# Patient Record
Sex: MALE | Race: WHITE | Employment: OTHER | URBAN - METROPOLITAN AREA
[De-identification: names, ages, dates, MRNs, and addresses within clinical notes are randomized per-mention and may not be internally consistent; named-entity substitution may affect disease eponyms.]

---

## 2017-01-19 ENCOUNTER — LAB REQUISITION (OUTPATIENT)
Dept: LAB | Facility: HOSPITAL | Age: 82
End: 2017-01-19
Payer: MEDICARE

## 2017-01-19 DIAGNOSIS — R31.9 HEMATURIA: ICD-10-CM

## 2017-01-19 PROCEDURE — 87086 URINE CULTURE/COLONY COUNT: CPT | Performed by: INTERNAL MEDICINE

## 2017-02-02 ENCOUNTER — HOSPITAL ENCOUNTER (OUTPATIENT)
Dept: CT IMAGING | Facility: HOSPITAL | Age: 82
Discharge: HOME | End: 2017-02-02
Attending: UROLOGY
Payer: MEDICARE

## 2017-02-02 DIAGNOSIS — R31.9 HEMATURIA: ICD-10-CM

## 2017-02-02 LAB — CREAT BLD-MCNC: 2 MG/DL (ref 0.5–1.5)

## 2017-02-02 PROCEDURE — 82565 ASSAY OF CREATININE: CPT

## 2017-02-02 NOTE — IMAGING NOTE
PATIENT IS FOUND TO HAVE A CREATININE OF 2.0 TODAY AND I SPOKE TO SHIMON AT DR. ED JENSEN'S OFFICE AND SHE INSTRUCTED ME TO NOT PERFORM ANY SCANS AND SEND THE PATIENT HOME.

## 2017-02-08 ENCOUNTER — HOSPITAL ENCOUNTER (OUTPATIENT)
Dept: CT IMAGING | Facility: HOSPITAL | Age: 82
Discharge: HOME | End: 2017-02-08
Attending: UROLOGY
Payer: MEDICARE

## 2017-02-08 DIAGNOSIS — R31.29 HEMATURIA, MICROSCOPIC: ICD-10-CM

## 2017-02-08 PROCEDURE — 88108 CYTOPATH CONCENTRATE TECH: CPT | Performed by: UROLOGY

## 2017-02-08 PROCEDURE — 74176 CT ABD & PELVIS W/O CONTRAST: CPT

## 2017-02-08 PROCEDURE — 80048 BASIC METABOLIC PNL TOTAL CA: CPT

## 2017-02-08 PROCEDURE — 85025 COMPLETE CBC W/AUTO DIFF WBC: CPT

## 2017-02-08 PROCEDURE — 36415 COLL VENOUS BLD VENIPUNCTURE: CPT

## 2017-04-14 ENCOUNTER — LAB REQUISITION (OUTPATIENT)
Dept: LAB | Facility: HOSPITAL | Age: 82
End: 2017-04-14
Payer: MEDICARE

## 2017-04-14 DIAGNOSIS — Z01.89 ENCOUNTER FOR OTHER SPECIFIED SPECIAL EXAMINATIONS: ICD-10-CM

## 2017-04-14 PROCEDURE — 80048 BASIC METABOLIC PNL TOTAL CA: CPT | Performed by: INTERNAL MEDICINE

## 2017-04-14 PROCEDURE — 83036 HEMOGLOBIN GLYCOSYLATED A1C: CPT | Performed by: INTERNAL MEDICINE

## 2017-04-14 PROCEDURE — 80061 LIPID PANEL: CPT | Performed by: INTERNAL MEDICINE

## 2017-04-14 PROCEDURE — 84460 ALANINE AMINO (ALT) (SGPT): CPT | Performed by: INTERNAL MEDICINE

## 2017-10-13 ENCOUNTER — LAB REQUISITION (OUTPATIENT)
Dept: LAB | Facility: HOSPITAL | Age: 82
End: 2017-10-13
Payer: MEDICARE

## 2017-10-13 DIAGNOSIS — R73.01 IMPAIRED FASTING GLUCOSE: ICD-10-CM

## 2017-10-13 DIAGNOSIS — E78.00 PURE HYPERCHOLESTEROLEMIA: ICD-10-CM

## 2017-10-13 DIAGNOSIS — N18.30 CHRONIC KIDNEY DISEASE, STAGE III (MODERATE) (HCC): ICD-10-CM

## 2017-10-13 PROCEDURE — 82043 UR ALBUMIN QUANTITATIVE: CPT | Performed by: INTERNAL MEDICINE

## 2017-10-13 PROCEDURE — 83036 HEMOGLOBIN GLYCOSYLATED A1C: CPT | Performed by: INTERNAL MEDICINE

## 2017-10-13 PROCEDURE — 80053 COMPREHEN METABOLIC PANEL: CPT | Performed by: INTERNAL MEDICINE

## 2017-10-13 PROCEDURE — 82570 ASSAY OF URINE CREATININE: CPT | Performed by: INTERNAL MEDICINE

## 2017-10-13 PROCEDURE — 81001 URINALYSIS AUTO W/SCOPE: CPT | Performed by: INTERNAL MEDICINE

## 2017-10-13 PROCEDURE — 84443 ASSAY THYROID STIM HORMONE: CPT | Performed by: INTERNAL MEDICINE

## 2017-10-13 PROCEDURE — 80061 LIPID PANEL: CPT | Performed by: INTERNAL MEDICINE

## 2017-10-13 PROCEDURE — 85025 COMPLETE CBC W/AUTO DIFF WBC: CPT | Performed by: INTERNAL MEDICINE

## 2017-10-16 ENCOUNTER — LAB REQUISITION (OUTPATIENT)
Dept: LAB | Facility: HOSPITAL | Age: 82
End: 2017-10-16
Payer: MEDICARE

## 2017-10-16 DIAGNOSIS — N39.0 URINARY TRACT INFECTION: ICD-10-CM

## 2017-10-16 PROCEDURE — 87086 URINE CULTURE/COLONY COUNT: CPT | Performed by: INTERNAL MEDICINE

## 2017-10-16 PROCEDURE — 87186 SC STD MICRODIL/AGAR DIL: CPT | Performed by: INTERNAL MEDICINE

## 2017-10-16 PROCEDURE — 87088 URINE BACTERIA CULTURE: CPT | Performed by: INTERNAL MEDICINE

## 2018-01-02 ENCOUNTER — LAB REQUISITION (OUTPATIENT)
Dept: LAB | Facility: HOSPITAL | Age: 83
End: 2018-01-02
Payer: MEDICARE

## 2018-01-02 DIAGNOSIS — N39.0 URINARY TRACT INFECTION: ICD-10-CM

## 2018-01-02 PROCEDURE — 87186 SC STD MICRODIL/AGAR DIL: CPT | Performed by: INTERNAL MEDICINE

## 2018-01-02 PROCEDURE — 87086 URINE CULTURE/COLONY COUNT: CPT | Performed by: INTERNAL MEDICINE

## 2018-01-02 PROCEDURE — 87088 URINE BACTERIA CULTURE: CPT | Performed by: INTERNAL MEDICINE

## 2018-03-13 ENCOUNTER — LAB REQUISITION (OUTPATIENT)
Dept: LAB | Facility: HOSPITAL | Age: 83
End: 2018-03-13
Payer: MEDICARE

## 2018-03-13 DIAGNOSIS — N39.0 URINARY TRACT INFECTION: ICD-10-CM

## 2018-03-13 PROCEDURE — 87186 SC STD MICRODIL/AGAR DIL: CPT | Performed by: INTERNAL MEDICINE

## 2018-03-13 PROCEDURE — 87086 URINE CULTURE/COLONY COUNT: CPT | Performed by: INTERNAL MEDICINE

## 2018-03-13 PROCEDURE — 87088 URINE BACTERIA CULTURE: CPT | Performed by: INTERNAL MEDICINE

## 2018-04-20 ENCOUNTER — LAB REQUISITION (OUTPATIENT)
Dept: LAB | Facility: HOSPITAL | Age: 83
End: 2018-04-20
Payer: MEDICARE

## 2018-04-20 DIAGNOSIS — E78.00 PURE HYPERCHOLESTEROLEMIA: ICD-10-CM

## 2018-04-20 DIAGNOSIS — N18.30 CHRONIC KIDNEY DISEASE, STAGE III (MODERATE) (HCC): ICD-10-CM

## 2018-04-20 DIAGNOSIS — R73.01 IMPAIRED FASTING GLUCOSE: ICD-10-CM

## 2018-04-20 PROCEDURE — 80061 LIPID PANEL: CPT | Performed by: INTERNAL MEDICINE

## 2018-04-20 PROCEDURE — 80048 BASIC METABOLIC PNL TOTAL CA: CPT | Performed by: INTERNAL MEDICINE

## 2018-04-20 PROCEDURE — 84460 ALANINE AMINO (ALT) (SGPT): CPT | Performed by: INTERNAL MEDICINE

## 2018-04-20 PROCEDURE — 83036 HEMOGLOBIN GLYCOSYLATED A1C: CPT | Performed by: INTERNAL MEDICINE

## 2018-10-10 ENCOUNTER — HOSPITAL ENCOUNTER (OUTPATIENT)
Dept: GENERAL RADIOLOGY | Age: 83
Discharge: HOME | End: 2018-10-10
Attending: INTERNAL MEDICINE
Payer: MEDICARE

## 2018-10-10 DIAGNOSIS — Z85.820 HISTORY OF MELANOMA: ICD-10-CM

## 2018-10-10 DIAGNOSIS — C43.9 MELANOMA (HCC): ICD-10-CM

## 2018-10-10 PROCEDURE — 71046 X-RAY EXAM CHEST 2 VIEWS: CPT | Performed by: INTERNAL MEDICINE

## 2018-10-12 ENCOUNTER — LAB REQUISITION (OUTPATIENT)
Dept: LAB | Facility: HOSPITAL | Age: 83
End: 2018-10-12
Payer: MEDICARE

## 2018-10-12 DIAGNOSIS — E78.00 PURE HYPERCHOLESTEROLEMIA: ICD-10-CM

## 2018-10-12 DIAGNOSIS — R73.01 IMPAIRED FASTING GLUCOSE: ICD-10-CM

## 2018-10-12 DIAGNOSIS — N18.30 CHRONIC KIDNEY DISEASE, STAGE III (MODERATE) (HCC): ICD-10-CM

## 2018-10-12 PROCEDURE — 87186 SC STD MICRODIL/AGAR DIL: CPT | Performed by: INTERNAL MEDICINE

## 2018-10-12 PROCEDURE — 83036 HEMOGLOBIN GLYCOSYLATED A1C: CPT | Performed by: INTERNAL MEDICINE

## 2018-10-12 PROCEDURE — 82570 ASSAY OF URINE CREATININE: CPT | Performed by: INTERNAL MEDICINE

## 2018-10-12 PROCEDURE — 85025 COMPLETE CBC W/AUTO DIFF WBC: CPT | Performed by: INTERNAL MEDICINE

## 2018-10-12 PROCEDURE — 82043 UR ALBUMIN QUANTITATIVE: CPT | Performed by: INTERNAL MEDICINE

## 2018-10-12 PROCEDURE — 87086 URINE CULTURE/COLONY COUNT: CPT | Performed by: INTERNAL MEDICINE

## 2018-10-12 PROCEDURE — 87088 URINE BACTERIA CULTURE: CPT | Performed by: INTERNAL MEDICINE

## 2018-10-12 PROCEDURE — 81001 URINALYSIS AUTO W/SCOPE: CPT | Performed by: INTERNAL MEDICINE

## 2018-10-12 PROCEDURE — 80053 COMPREHEN METABOLIC PANEL: CPT | Performed by: INTERNAL MEDICINE

## 2018-10-12 PROCEDURE — 80061 LIPID PANEL: CPT | Performed by: INTERNAL MEDICINE

## 2019-04-12 ENCOUNTER — LAB REQUISITION (OUTPATIENT)
Dept: LAB | Facility: HOSPITAL | Age: 84
End: 2019-04-12
Payer: MEDICARE

## 2019-04-12 DIAGNOSIS — R73.01 IMPAIRED FASTING GLUCOSE: ICD-10-CM

## 2019-04-12 DIAGNOSIS — N18.30 CHRONIC KIDNEY DISEASE, STAGE III (MODERATE) (HCC): ICD-10-CM

## 2019-04-12 DIAGNOSIS — E78.00 PURE HYPERCHOLESTEROLEMIA: ICD-10-CM

## 2019-04-12 PROCEDURE — 84460 ALANINE AMINO (ALT) (SGPT): CPT | Performed by: INTERNAL MEDICINE

## 2019-04-12 PROCEDURE — 83036 HEMOGLOBIN GLYCOSYLATED A1C: CPT | Performed by: INTERNAL MEDICINE

## 2019-04-12 PROCEDURE — 80048 BASIC METABOLIC PNL TOTAL CA: CPT | Performed by: INTERNAL MEDICINE

## 2019-04-12 PROCEDURE — 80061 LIPID PANEL: CPT | Performed by: INTERNAL MEDICINE

## 2019-10-11 ENCOUNTER — LAB REQUISITION (OUTPATIENT)
Dept: LAB | Facility: HOSPITAL | Age: 84
End: 2019-10-11
Payer: MEDICARE

## 2019-10-11 DIAGNOSIS — E78.00 PURE HYPERCHOLESTEROLEMIA, UNSPECIFIED: ICD-10-CM

## 2019-10-11 DIAGNOSIS — N18.30 CHRONIC KIDNEY DISEASE, STAGE 3 (MODERATE): ICD-10-CM

## 2019-10-11 DIAGNOSIS — R73.01 IMPAIRED FASTING GLUCOSE: ICD-10-CM

## 2019-10-11 PROCEDURE — 81001 URINALYSIS AUTO W/SCOPE: CPT | Performed by: INTERNAL MEDICINE

## 2019-10-11 PROCEDURE — 87186 SC STD MICRODIL/AGAR DIL: CPT | Performed by: INTERNAL MEDICINE

## 2019-10-11 PROCEDURE — 80053 COMPREHEN METABOLIC PANEL: CPT | Performed by: INTERNAL MEDICINE

## 2019-10-11 PROCEDURE — 82570 ASSAY OF URINE CREATININE: CPT | Performed by: INTERNAL MEDICINE

## 2019-10-11 PROCEDURE — 87086 URINE CULTURE/COLONY COUNT: CPT | Performed by: INTERNAL MEDICINE

## 2019-10-11 PROCEDURE — 80061 LIPID PANEL: CPT | Performed by: INTERNAL MEDICINE

## 2019-10-11 PROCEDURE — 87088 URINE BACTERIA CULTURE: CPT | Performed by: INTERNAL MEDICINE

## 2019-10-11 PROCEDURE — 83036 HEMOGLOBIN GLYCOSYLATED A1C: CPT | Performed by: INTERNAL MEDICINE

## 2019-10-11 PROCEDURE — 82043 UR ALBUMIN QUANTITATIVE: CPT | Performed by: INTERNAL MEDICINE

## 2019-10-11 PROCEDURE — 85025 COMPLETE CBC W/AUTO DIFF WBC: CPT | Performed by: INTERNAL MEDICINE

## 2019-10-11 PROCEDURE — 84443 ASSAY THYROID STIM HORMONE: CPT | Performed by: INTERNAL MEDICINE

## 2019-11-25 ENCOUNTER — LAB REQUISITION (OUTPATIENT)
Dept: LAB | Facility: HOSPITAL | Age: 84
End: 2019-11-25
Payer: MEDICARE

## 2019-11-25 DIAGNOSIS — N39.0 URINARY TRACT INFECTION, SITE NOT SPECIFIED: ICD-10-CM

## 2019-11-25 PROCEDURE — 87186 SC STD MICRODIL/AGAR DIL: CPT | Performed by: INTERNAL MEDICINE

## 2019-11-25 PROCEDURE — 87088 URINE BACTERIA CULTURE: CPT | Performed by: INTERNAL MEDICINE

## 2019-11-25 PROCEDURE — 87086 URINE CULTURE/COLONY COUNT: CPT | Performed by: INTERNAL MEDICINE

## 2020-01-21 ENCOUNTER — LAB REQUISITION (OUTPATIENT)
Dept: LAB | Facility: HOSPITAL | Age: 85
End: 2020-01-21
Payer: MEDICARE

## 2020-01-21 DIAGNOSIS — N39.0 URINARY TRACT INFECTION, SITE NOT SPECIFIED: ICD-10-CM

## 2020-01-21 PROCEDURE — 87086 URINE CULTURE/COLONY COUNT: CPT | Performed by: INTERNAL MEDICINE

## 2020-04-21 ENCOUNTER — LAB ENCOUNTER (OUTPATIENT)
Dept: LAB | Facility: HOSPITAL | Age: 85
End: 2020-04-21
Attending: INTERNAL MEDICINE
Payer: MEDICARE

## 2020-04-21 DIAGNOSIS — N18.30 CHRONIC KIDNEY DISEASE, STAGE III (MODERATE) (HCC): ICD-10-CM

## 2020-04-21 DIAGNOSIS — R73.01 IMPAIRED FASTING GLUCOSE: ICD-10-CM

## 2020-04-21 DIAGNOSIS — E78.00 PURE HYPERCHOLESTEROLEMIA: Primary | ICD-10-CM

## 2020-04-21 PROCEDURE — 84460 ALANINE AMINO (ALT) (SGPT): CPT

## 2020-04-21 PROCEDURE — 80061 LIPID PANEL: CPT

## 2020-04-21 PROCEDURE — 36415 COLL VENOUS BLD VENIPUNCTURE: CPT

## 2020-04-21 PROCEDURE — 83036 HEMOGLOBIN GLYCOSYLATED A1C: CPT

## 2020-04-21 PROCEDURE — 80048 BASIC METABOLIC PNL TOTAL CA: CPT

## 2020-10-16 ENCOUNTER — LAB ENCOUNTER (OUTPATIENT)
Dept: LAB | Age: 85
End: 2020-10-16
Attending: INTERNAL MEDICINE
Payer: MEDICARE

## 2020-10-16 DIAGNOSIS — R73.01 IMPAIRED FASTING GLUCOSE: ICD-10-CM

## 2020-10-16 DIAGNOSIS — N18.30 CHRONIC KIDNEY DISEASE, STAGE III (MODERATE) (HCC): ICD-10-CM

## 2020-10-16 DIAGNOSIS — E78.00 PURE HYPERCHOLESTEROLEMIA: Primary | ICD-10-CM

## 2020-10-16 PROCEDURE — 84443 ASSAY THYROID STIM HORMONE: CPT

## 2020-10-16 PROCEDURE — 80061 LIPID PANEL: CPT

## 2020-10-16 PROCEDURE — 85025 COMPLETE CBC W/AUTO DIFF WBC: CPT

## 2020-10-16 PROCEDURE — 83036 HEMOGLOBIN GLYCOSYLATED A1C: CPT

## 2020-10-16 PROCEDURE — 80053 COMPREHEN METABOLIC PANEL: CPT

## 2020-10-16 PROCEDURE — 81001 URINALYSIS AUTO W/SCOPE: CPT

## 2020-10-16 PROCEDURE — 82043 UR ALBUMIN QUANTITATIVE: CPT

## 2020-10-16 PROCEDURE — 36415 COLL VENOUS BLD VENIPUNCTURE: CPT

## 2020-10-16 PROCEDURE — 82570 ASSAY OF URINE CREATININE: CPT

## 2021-02-01 DIAGNOSIS — N30.00 ACUTE CYSTITIS WITHOUT HEMATURIA: Primary | ICD-10-CM

## 2021-02-03 ENCOUNTER — LAB ENCOUNTER (OUTPATIENT)
Dept: LAB | Age: 86
End: 2021-02-03
Attending: INTERNAL MEDICINE
Payer: MEDICARE

## 2021-02-03 DIAGNOSIS — N30.00 ACUTE CYSTITIS WITHOUT HEMATURIA: ICD-10-CM

## 2021-02-03 LAB
BILIRUB UR QL: NEGATIVE
COLOR UR: YELLOW
GLUCOSE UR-MCNC: NEGATIVE MG/DL
HYALINE CASTS #/AREA URNS AUTO: 3 /LPF
KETONES UR-MCNC: NEGATIVE MG/DL
NITRITE UR QL STRIP.AUTO: NEGATIVE
PH UR: 6 [PH] (ref 5–8)
PROT UR-MCNC: 30 MG/DL
RBC #/AREA URNS AUTO: 21 /HPF
SP GR UR STRIP: 1.02 (ref 1–1.03)
UROBILINOGEN UR STRIP-ACNC: <2
WBC #/AREA URNS AUTO: 145 /HPF

## 2021-02-03 PROCEDURE — 87077 CULTURE AEROBIC IDENTIFY: CPT

## 2021-02-03 PROCEDURE — 87086 URINE CULTURE/COLONY COUNT: CPT

## 2021-02-03 PROCEDURE — 87186 SC STD MICRODIL/AGAR DIL: CPT

## 2021-02-03 PROCEDURE — 81001 URINALYSIS AUTO W/SCOPE: CPT

## 2021-04-14 ENCOUNTER — EKG ENCOUNTER (OUTPATIENT)
Dept: LAB | Age: 86
End: 2021-04-14
Attending: INTERNAL MEDICINE
Payer: MEDICARE

## 2021-04-14 ENCOUNTER — LAB ENCOUNTER (OUTPATIENT)
Dept: LAB | Age: 86
End: 2021-04-14
Attending: INTERNAL MEDICINE
Payer: MEDICARE

## 2021-04-14 DIAGNOSIS — Z01.818 PREOP EXAMINATION: ICD-10-CM

## 2021-04-14 DIAGNOSIS — D03.9: Primary | ICD-10-CM

## 2021-04-14 PROCEDURE — 85025 COMPLETE CBC W/AUTO DIFF WBC: CPT

## 2021-04-14 PROCEDURE — 93010 ELECTROCARDIOGRAM REPORT: CPT | Performed by: PLASTIC SURGERY

## 2021-04-14 PROCEDURE — 36415 COLL VENOUS BLD VENIPUNCTURE: CPT

## 2021-04-14 PROCEDURE — 93005 ELECTROCARDIOGRAM TRACING: CPT

## 2021-04-14 PROCEDURE — 80048 BASIC METABOLIC PNL TOTAL CA: CPT

## 2021-08-20 ENCOUNTER — TELEPHONE (OUTPATIENT)
Dept: ORTHOPEDICS CLINIC | Facility: CLINIC | Age: 86
End: 2021-08-20

## 2021-08-20 DIAGNOSIS — M25.561 RIGHT KNEE PAIN, UNSPECIFIED CHRONICITY: Primary | ICD-10-CM

## 2021-08-20 DIAGNOSIS — M25.562 LEFT KNEE PAIN, UNSPECIFIED CHRONICITY: ICD-10-CM

## 2021-08-20 NOTE — TELEPHONE ENCOUNTER
Requisite for xray order  Reviewed patients chart, xray orders are required. Order placed for bilateral knee xrays    • Spoke to patient informed to arrive 30 mins prior to patients appt to complete x-ray order.  Patient verbalized understanding and agreeme

## 2021-08-20 NOTE — TELEPHONE ENCOUNTER
Patient coming in for bilateral knee pain and weaknesses. Patient has not had imaging, please place Rx order if needed.    Future Appointments   Date Time Provider Sunny Snyder   9/8/2021  3:40 PM Cathryn Phan MD [facility]

## 2021-09-08 ENCOUNTER — OFFICE VISIT (OUTPATIENT)
Dept: ORTHOPEDICS CLINIC | Facility: CLINIC | Age: 86
End: 2021-09-08
Payer: MEDICARE

## 2021-09-08 ENCOUNTER — HOSPITAL ENCOUNTER (OUTPATIENT)
Dept: GENERAL RADIOLOGY | Age: 86
Discharge: HOME | End: 2021-09-08
Attending: ORTHOPAEDIC SURGERY
Payer: MEDICARE

## 2021-09-08 VITALS — OXYGEN SATURATION: 99 % | HEART RATE: 90 BPM

## 2021-09-08 DIAGNOSIS — M25.562 LEFT KNEE PAIN, UNSPECIFIED CHRONICITY: ICD-10-CM

## 2021-09-08 DIAGNOSIS — M25.561 RIGHT KNEE PAIN, UNSPECIFIED CHRONICITY: ICD-10-CM

## 2021-09-08 DIAGNOSIS — M17.10 ARTHRITIS OF KNEE: Primary | ICD-10-CM

## 2021-09-08 PROCEDURE — 99204 OFFICE O/P NEW MOD 45 MIN: CPT | Performed by: ORTHOPAEDIC SURGERY

## 2021-09-08 PROCEDURE — 73564 X-RAY EXAM KNEE 4 OR MORE: CPT | Performed by: ORTHOPAEDIC SURGERY

## 2021-09-08 PROCEDURE — 20610 DRAIN/INJ JOINT/BURSA W/O US: CPT | Performed by: ORTHOPAEDIC SURGERY

## 2021-09-08 RX ORDER — TRIAMCINOLONE ACETONIDE 40 MG/ML
40 INJECTION, SUSPENSION INTRA-ARTICULAR; INTRAMUSCULAR ONCE
Status: COMPLETED | OUTPATIENT
Start: 2021-09-08 | End: 2021-09-08

## 2021-09-08 RX ORDER — KETOROLAC TROMETHAMINE 30 MG/ML
30 INJECTION, SOLUTION INTRAMUSCULAR; INTRAVENOUS ONCE
Status: COMPLETED | OUTPATIENT
Start: 2021-09-08 | End: 2021-09-08

## 2021-09-08 RX ADMIN — KETOROLAC TROMETHAMINE 30 MG: 30 INJECTION, SOLUTION INTRAMUSCULAR; INTRAVENOUS at 16:12:00

## 2021-09-08 RX ADMIN — TRIAMCINOLONE ACETONIDE 40 MG: 40 INJECTION, SUSPENSION INTRA-ARTICULAR; INTRAMUSCULAR at 16:17:00

## 2021-09-09 NOTE — PROCEDURES
Left Knee Intra-articular Injection    Name: Hemalatha Henderson   MRN: [id number]  Date: 9/8/2021     Clinical Indications:   Knee Osteoarthritis with symptoms refractory to conservative measures.      After informed consent, the injection site was marked, darrian

## 2021-09-09 NOTE — H&P
[facility] - ORTHOPEDICS  [facility] [address] [id number]  [phone]     NEW PATIENT VISIT - HISTORY AND PHYSICAL EXAMINATION     Name: Loc Tyler   MRN: [id number]  Date: 9/8/2021     CC: Left Knee Pain MORNING  1   • Irbesartan 300 MG Oral Tab Take 300 mg by mouth once daily. 1   • Montelukast Sodium 10 MG Oral Tab Take 10 mg by mouth daily.     1   • KLOR-CON M20 20 MEQ Oral Tab CR TAKE 4 TABLETS BY MOUTH EVERY DAY ONCE A DAY ORALLY 90 DAYS  1   • David tenderness: both  Crepitation: mild   Kylah: Negative   Patellar mobility: normal without apprehension  J-sign: none    ROM: Extension lacking less than 5 degrees  Flexion 120 degrees  ACL:  Negative Lachman, Negative Pivot Shift   PCL:  Negative Poster at 3:32 PM       XR KNEE, COMPLETE (4 OR MORE VIEWS), RIGHT ([id number])    Result Date: 9/8/2021  PROCEDURE:  XR KNEE, COMPLETE (4 OR MORE VIEWS), RIGHT ([id number]=[id number])  TECHNIQUE:  AP, lateral, sunrise, and tunnel views were obtained  COMPARISON:  None.   YELENA it was briefly proofread prior to completion, some grammatical, spelling, and word choice errors due to dictation may still occur.

## 2021-10-01 ENCOUNTER — MED REC SCAN ONLY (OUTPATIENT)
Dept: ORTHOPEDICS CLINIC | Facility: CLINIC | Age: 86
End: 2021-10-01

## 2021-11-05 ENCOUNTER — IMMUNIZATION (OUTPATIENT)
Dept: LAB | Facility: HOSPITAL | Age: 86
End: 2021-11-05
Attending: EMERGENCY MEDICINE
Payer: MEDICARE

## 2021-11-05 DIAGNOSIS — Z23 NEED FOR VACCINATION: Primary | ICD-10-CM

## 2021-11-05 PROCEDURE — 0004A SARSCOV2 VAC 30MCG/0.3ML IM: CPT

## 2021-12-01 ENCOUNTER — MED REC SCAN ONLY (OUTPATIENT)
Dept: ORTHOPEDICS CLINIC | Facility: CLINIC | Age: 86
End: 2021-12-01

## 2021-12-08 ENCOUNTER — OFFICE VISIT (OUTPATIENT)
Dept: ORTHOPEDICS CLINIC | Facility: CLINIC | Age: 86
End: 2021-12-08
Payer: MEDICARE

## 2021-12-08 VITALS — BODY MASS INDEX: 34.86 KG/M2 | HEIGHT: 68 IN | HEART RATE: 69 BPM | OXYGEN SATURATION: 99 % | WEIGHT: 230 LBS

## 2021-12-08 DIAGNOSIS — M17.12 PRIMARY OSTEOARTHRITIS OF LEFT KNEE: Primary | ICD-10-CM

## 2021-12-08 PROCEDURE — 99213 OFFICE O/P EST LOW 20 MIN: CPT | Performed by: PHYSICIAN ASSISTANT

## 2021-12-08 NOTE — PROGRESS NOTES
[facility] - ORTHOPEDICS  [address]       Name: Digna Mullins   MRN: [id number]  Date: 12/8/2021     REASON FOR VISIT: Follow up for left knee osteoarthritis.       INTERVAL contralateral knee is without limitation in range of motion or strength, no positive provocative maneuvers. IMPRESSION: Liliane Castanon is a 80year old male who presented for follow up of left knee osteoarthritis.      PLAN:   We had a detailed discussi

## 2022-06-02 ENCOUNTER — HOSPITAL ENCOUNTER (OUTPATIENT)
Age: 87
Discharge: HOME | End: 2022-06-02
Payer: MEDICARE

## 2022-06-02 VITALS
RESPIRATION RATE: 20 BRPM | SYSTOLIC BLOOD PRESSURE: 127 MMHG | OXYGEN SATURATION: 96 % | DIASTOLIC BLOOD PRESSURE: 66 MMHG | TEMPERATURE: 99 F | HEART RATE: 73 BPM

## 2022-06-02 DIAGNOSIS — Z20.822 ENCOUNTER FOR LABORATORY TESTING FOR COVID-19 VIRUS: ICD-10-CM

## 2022-06-02 DIAGNOSIS — U07.1 COVID-19: Primary | ICD-10-CM

## 2022-06-02 DIAGNOSIS — R05.9 COUGH: ICD-10-CM

## 2022-06-02 LAB
POCT INFLUENZA A: NEGATIVE
POCT INFLUENZA B: NEGATIVE
SARS-COV-2 RNA RESP QL NAA+PROBE: DETECTED

## 2022-06-02 PROCEDURE — 99203 OFFICE O/P NEW LOW 30 MIN: CPT | Performed by: NURSE PRACTITIONER

## 2022-06-02 PROCEDURE — M0222 INTRAVENOUS INJECTION, BEBTELOVIMAB, INCLUDES INJECTION AND POST ADMINISTRATIVE MONITORING: HCPCS | Performed by: NURSE PRACTITIONER

## 2022-06-02 PROCEDURE — 87502 INFLUENZA DNA AMP PROBE: CPT | Performed by: NURSE PRACTITIONER

## 2022-06-02 PROCEDURE — U0002 COVID-19 LAB TEST NON-CDC: HCPCS | Performed by: NURSE PRACTITIONER

## 2022-06-02 RX ORDER — BEBTELOVIMAB 87.5 MG/ML
175 INJECTION, SOLUTION INTRAVENOUS ONCE
Status: COMPLETED | OUTPATIENT
Start: 2022-06-02 | End: 2022-06-02

## 2022-06-02 NOTE — ED QUICK NOTES
Patient tolerated the IV push beb without any problems. Resting in the reclined chair with feet elevated and pillow behind his head. Denies needing anything at this time. Son at the bedside.

## 2022-06-02 NOTE — ED INITIAL ASSESSMENT (HPI)
Patient here with complaints of lethargy, headaches, dry cough, and diarrhea that started this morning. Tylenol 500mg around 1530 and has helped with the headache. He had a close contact with a son 5-7 days ago that tested positive today.

## 2024-08-23 ENCOUNTER — LAB ENCOUNTER (OUTPATIENT)
Dept: LAB | Facility: HOSPITAL | Age: 89
End: 2024-08-23
Attending: INTERNAL MEDICINE
Payer: MEDICARE

## 2024-08-23 DIAGNOSIS — R73.01 IMPAIRED FASTING GLUCOSE: ICD-10-CM

## 2024-08-23 DIAGNOSIS — E78.00 PURE HYPERCHOLESTEROLEMIA: Primary | ICD-10-CM

## 2024-08-23 LAB
ALT SERPL-CCNC: 18 U/L
ANION GAP SERPL CALC-SCNC: 5 MMOL/L (ref 0–18)
BUN BLD-MCNC: 24 MG/DL (ref 9–23)
BUN/CREAT SERPL: 15.2 (ref 10–20)
CALCIUM BLD-MCNC: 9.5 MG/DL (ref 8.7–10.4)
CHLORIDE SERPL-SCNC: 106 MMOL/L (ref 98–112)
CHOLEST SERPL-MCNC: 136 MG/DL (ref ?–200)
CO2 SERPL-SCNC: 32 MMOL/L (ref 21–32)
CREAT BLD-MCNC: 1.58 MG/DL
EGFRCR SERPLBLD CKD-EPI 2021: 40 ML/MIN/1.73M2 (ref 60–?)
EST. AVERAGE GLUCOSE BLD GHB EST-MCNC: 117 MG/DL (ref 68–126)
FASTING PATIENT LIPID ANSWER: YES
FASTING STATUS PATIENT QL REPORTED: YES
GLUCOSE BLD-MCNC: 97 MG/DL (ref 70–99)
HBA1C MFR BLD: 5.7 % (ref ?–5.7)
HDLC SERPL-MCNC: 40 MG/DL (ref 40–59)
LDLC SERPL CALC-MCNC: 80 MG/DL (ref ?–100)
NONHDLC SERPL-MCNC: 96 MG/DL (ref ?–130)
OSMOLALITY SERPL CALC.SUM OF ELEC: 300 MOSM/KG (ref 275–295)
POTASSIUM SERPL-SCNC: 4.5 MMOL/L (ref 3.5–5.1)
SODIUM SERPL-SCNC: 143 MMOL/L (ref 136–145)
TRIGL SERPL-MCNC: 79 MG/DL (ref 30–149)
VLDLC SERPL CALC-MCNC: 12 MG/DL (ref 0–30)

## 2024-08-23 PROCEDURE — 84460 ALANINE AMINO (ALT) (SGPT): CPT

## 2024-08-23 PROCEDURE — 80048 BASIC METABOLIC PNL TOTAL CA: CPT

## 2024-08-23 PROCEDURE — 36415 COLL VENOUS BLD VENIPUNCTURE: CPT

## 2024-08-23 PROCEDURE — 83036 HEMOGLOBIN GLYCOSYLATED A1C: CPT

## 2024-08-23 PROCEDURE — 80061 LIPID PANEL: CPT

## 2024-10-08 ENCOUNTER — TELEPHONE (OUTPATIENT)
Dept: ORTHOPEDICS CLINIC | Facility: CLINIC | Age: 89
End: 2024-10-08

## 2024-10-08 NOTE — TELEPHONE ENCOUNTER
Scheduled with Dr. Smith per last notes.    Future Appointments   Date Time Provider Department Center   10/18/2024 11:00 AM Chris Smith MD [facility]

## 2024-10-08 NOTE — TELEPHONE ENCOUNTER
The patient exceeds Dr. Dsouza's age criteria for knee appointments, as Dr. Dsouza only sees patients aged 69 and under.     The patient was last seen by Dr. Dsouza in 2021, and since it has been three years, the patient is now considered a new patient.     Dr. Dsouza has recommended that the patient schedule follow-up appointments with either Dr. Stone or Dr. Smith moving forward.

## 2024-10-18 ENCOUNTER — OFFICE VISIT (OUTPATIENT)
Dept: ORTHOPEDICS CLINIC | Facility: CLINIC | Age: 89
End: 2024-10-18
Payer: MEDICARE

## 2024-10-18 VITALS — HEIGHT: 68 IN | BODY MASS INDEX: 34.1 KG/M2 | WEIGHT: 225 LBS

## 2024-10-18 DIAGNOSIS — M17.12 PRIMARY OSTEOARTHRITIS OF LEFT KNEE: Primary | ICD-10-CM

## 2024-10-18 PROCEDURE — 99203 OFFICE O/P NEW LOW 30 MIN: CPT | Performed by: STUDENT IN AN ORGANIZED HEALTH CARE EDUCATION/TRAINING PROGRAM

## 2024-10-18 PROCEDURE — 20610 DRAIN/INJ JOINT/BURSA W/O US: CPT | Performed by: STUDENT IN AN ORGANIZED HEALTH CARE EDUCATION/TRAINING PROGRAM

## 2024-10-18 RX ORDER — APIXABAN 2.5 MG/1
2.5 TABLET, FILM COATED ORAL 2 TIMES DAILY
COMMUNITY
Start: 2024-07-24

## 2024-10-18 RX ORDER — TRIAMCINOLONE ACETONIDE 40 MG/ML
40 INJECTION, SUSPENSION INTRA-ARTICULAR; INTRAMUSCULAR ONCE
Status: COMPLETED | OUTPATIENT
Start: 2024-10-18 | End: 2024-10-18

## 2024-10-18 RX ORDER — LOSARTAN POTASSIUM 100 MG/1
100 TABLET ORAL DAILY
COMMUNITY
Start: 2024-09-20

## 2024-10-18 RX ORDER — ALBUTEROL SULFATE 90 UG/1
INHALANT RESPIRATORY (INHALATION)
COMMUNITY
Start: 2024-09-28

## 2024-10-18 RX ORDER — PANTOPRAZOLE SODIUM 40 MG/1
TABLET, DELAYED RELEASE ORAL
COMMUNITY
Start: 2024-09-23

## 2024-10-18 RX ORDER — BUMETANIDE 1 MG/1
1 TABLET ORAL DAILY
COMMUNITY
Start: 2024-08-29

## 2024-10-18 RX ADMIN — TRIAMCINOLONE ACETONIDE 40 MG: 40 INJECTION, SUSPENSION INTRA-ARTICULAR; INTRAMUSCULAR at 11:25:00

## 2024-10-18 NOTE — PROGRESS NOTES
Orthopaedic Surgery  [address] [address] [address]   [phone]      Chief Complaint:  Left Knee Pain    History of Present Illness:   Corbin Van is a 89 year old male seen in clinic today as new to me but established to the practice for evaluation of their left knee. Onset was about 4 years ago. Pain is located over the medial aspect of the knee and described as aching. Their joint pain interferes with their activities of daily life such as walking, going up or down stairs, getting up from sitting, and exercising. Their condition is stable.  Associated with their joint pain, they report stiffness. Their symptoms are worsened by weightbearing activity and as the day progresses. They have difficulty with walking prolonged distances on flat surfaces as well as stairs. Their symptoms are made better by rest. No prior surgeries to the involved knee.     Prior imaging studies have included XRs. Treatment so far has consisted of conservative management including combination of rest, ice, or elevation and physical therapy. Patient on blood thinners (Eliquis) and told not to take oral NSAIDs but has been using Voltaren which provides limited relief. He started PT yesterday and feels this is helping. Patient also received a CSI with Dr Dsouza in 2021 which helped for about 6 weeks at the time.    Assistive devices used: cane      PMH/PSH/Family History/Social History/Meds/Allergies:   Past Medical History:    Atherosclerosis of coronary artery    Cancer (HCC)    skin cancer    Coronary atherosclerosis    High blood pressure    High cholesterol    Hyperlipidemia    Pacemaker        Past Surgical History:   Procedure Laterality Date    Other surgical history      excision of skin cancer from the face        History reviewed. No pertinent family history.     Social History     Socioeconomic History    Marital status:      Spouse name: Not on file    Number of children: Not on file    Years of  education: Not on file    Highest education level: Not on file   Occupational History    Not on file   Tobacco Use    Smoking status: Former     Current packs/day: 0.00     Types: Cigarettes     Quit date: 1966     Years since quittin.7    Smokeless tobacco: Never   Vaping Use    Vaping status: Never Used   Substance and Sexual Activity    Alcohol use: Yes     Alcohol/week: 14.0 standard drinks of alcohol     Types: 14 Glasses of wine per week    Drug use: Not on file    Sexual activity: Not on file   Other Topics Concern    Not on file   Social History Narrative    Not on file     Social Drivers of Health     Financial Resource Strain: Not on file   Food Insecurity: Not on file   Transportation Needs: Not on file   Physical Activity: Not on file   Stress: Not on file   Social Connections: Not on file   Housing Stability: Low Risk  (2024)    Received from [facility]    Housing Stability     Mortgage Payment Concerns?: Not on file     Number of Places Lived in the Last Year: Not on file     Unstable Housing?: Not on file        Current Outpatient Medications   Medication Instructions    albuterol 108 (90 Base) MCG/ACT Inhalation Aero Soln INHALE 2 PUFFS INTO THE LUNGS EVERY 4 HOURS AS NEEDED FOR 30 DAYS    amLODIPine (NORVASC) 5 mg, Daily (RT)    atenolol (TENORMIN) 25 mg, Daily (RT)    bumetanide (BUMEX) 1 mg, Daily    Eliquis 2.5 mg, 2 times daily    ergocalciferol 03911 UNITS Oral Cap 1 CAPSULE ONCE PER WEEK ORALLY 90 DAYS    hydrochlorothiazide 25 MG Oral Tab TAKE 1 TABLET BY MOUTH EVERY DAY IN THE MORNING    Irbesartan 300 mg, Daily (RT)    KLOR-CON M20 20 MEQ Oral Tab CR TAKE 4 TABLETS BY MOUTH EVERY DAY ONCE A DAY ORALLY 90 DAYS    losartan (COZAAR) 100 mg, Daily    montelukast (SINGULAIR) 10 mg, Daily    pantoprazole 40 MG Oral Tab EC TAKE 1 PILL DAILY, 30-60 MINUTES BEFORE A CONSISTENT MEAL    pravastatin (PRAVACHOL) 40 mg, Daily (RT)        Allergies[1]       Physical  Exam:   Vitals:    10/18/24 1057   Weight: 225 lb (102.1 kg)   Height: 5' 8\" (1.727 m)     Estimated body mass index is 34.21 kg/m² as calculated from the following:    Height as of this encounter: 5' 8\" (1.727 m).    Weight as of this encounter: 225 lb (102.1 kg).    Constitutional: No acute distress, well nourished.  Eyes: Anicteric sclera, pink conjunctiva  Ears, Nose, Mouth and Throat: Normocephalic, atraumatic, moist mucous membranes  Cardiovascular: No pitting edema or varicosities in the lower extremities. Maneuvers demonstrate a negative Marleen's sign. No palpable cords in calf noted.   Respiratory: No respiratory distress, normal respiratory rhythm and effort   Neurological:  Oriented to person, place, and time.  Psychological:  Appropriate mood and affect.    Comprehensive left Knee Exam:      Inspection: No erythema, ecchymoses, or wounds. No rash. No previous incisions noted. Mild effusion  Alignment: mild varus  ROM: 0 - 100 degrees, flexion contracture: 0 degrees, quad lag: no  Stability: A/P stress: stable, firm endpoint, M/L stress: stable, firm endpoint  Pain or crepitus with ROM?: Yes  Tenderness to palpation at: medial joint line; Non-tender at: lateral joint line, patella  Strength: Intact 5/5 strength SLR and TA/GS/FHL/EHL  Sensation: Grossly intact to light touch over SPN/DPN/Saph/Sural/Tibial nerve distributions  Vasc: Warm perfused extremity        Imaging:   Weightbearing XRs of the left knee were reviewed from 2021 with AP, PA Flex, sunrise, and lateral views    They show severe degenerative changes involving the medial compartment of the knee with bone on bone articulation. Lateral and patellofemoral compartments appear well preserved. No fracture or dislocation seen    Assessment:     ICD-10-CM    1. Primary osteoarthritis of left knee  M17.12 Large joint - left aspiration/injection     triamcinolone acetonide (Kenalog-40) 40 MG/ML injection 40 mg             Plan:   At today's visit I  discussed with the patient their diagnosis and the factors considered to form this diagnosis. Their history and physical exam and radiographic findings are consistent with arthritis of the knee. As we discussed their diagnosis, information was provided regarding the underlying pathology and the natural course of this progressive condition.    During our discussion, we detailed various treatment options available. We counseled the patient on treatment options. Major joint arthritis is usually a chronic condition that can be effectively managed with conservative modalities. Once these conservative measures fail to provide reasonable therapeutic benefit, surgical procedures may be indicated but given his age and cardiac co-morbidities, I recommend continued conservative management.    Conservative measures discussed include activity modification, home exercise programs, physical therapy, acetaminophen, braces, assistive devices, and intraarticular injections.     In consideration of their prior treatments, today, I have recommended continued conservative treatment and recommend combination of rest, ice, or elevation, physical therapy, and corticosteroid injections. We discussed that their plan is customized and can be modified at any time with new information or changing exam findings.    Injections may be given every 3 months. Follow up as needed.      Procedure Note:  Orders Placed This Encounter   Procedures    Large joint - left aspiration/injection      Risks and benefits of knee injection discussed with the patient, with risks including but not limited to pain and swelling at the injection site and/or within the knee joint, infection, bleeding, elevation in blood pressure and/or glucose levels, facial flushing.  After informed consent, the patient's left knee was marked, locally anesthetized with skin refrigerant, prepped with topical antiseptic, and injected with a mixture of 1mL 40mg/mL Kenalog, 2mL 1%  lidocaine and 2mL 0.5% marcaine through the inferolateral portal.  Hemostasis was achieved and a band-aid was applied.  The patient tolerated the procedure well.    Thank you very much for allowing me to participate in the care of this patient. If you have any questions, please do not hesitate to contact me.      Chris Smith MD  Adult Hip and Knee Reconstruction    Department of Orthopaedic Surgery  [facility]     [address] [place] [address]  [address] [place] [address]     t: [phone]  f: [phone]       [email or web address]         [1] No Known Allergies

## 2025-01-20 ENCOUNTER — HOSPITAL ENCOUNTER (EMERGENCY)
Facility: HOSPITAL | Age: OVER 89
Discharge: HOME | End: 2025-01-20
Attending: EMERGENCY MEDICINE
Payer: MEDICARE

## 2025-01-20 ENCOUNTER — APPOINTMENT (OUTPATIENT)
Dept: CT IMAGING | Facility: HOSPITAL | Age: OVER 89
End: 2025-01-20
Attending: EMERGENCY MEDICINE
Payer: MEDICARE

## 2025-01-20 VITALS
WEIGHT: 256 LBS | RESPIRATION RATE: 16 BRPM | HEART RATE: 66 BPM | HEIGHT: 68 IN | OXYGEN SATURATION: 96 % | SYSTOLIC BLOOD PRESSURE: 103 MMHG | DIASTOLIC BLOOD PRESSURE: 63 MMHG | BODY MASS INDEX: 38.8 KG/M2 | TEMPERATURE: 98 F

## 2025-01-20 DIAGNOSIS — S00.93XA CONTUSION OF HEAD, UNSPECIFIED PART OF HEAD, INITIAL ENCOUNTER: Primary | ICD-10-CM

## 2025-01-20 PROCEDURE — 70450 CT HEAD/BRAIN W/O DYE: CPT | Performed by: EMERGENCY MEDICINE

## 2025-01-20 PROCEDURE — 99284 EMERGENCY DEPT VISIT MOD MDM: CPT

## 2025-01-20 NOTE — ED QUICK NOTES
Pt verbalizes understanding of discharge paperwork including medications, follow-up care, and education. Pt a/o x4, VSS, NAD, provided with wheelchair to WR per request.

## 2025-01-20 NOTE — ED PROVIDER NOTES
Patient Seen in: [facility] Emergency Department    History     Chief Complaint   Patient presents with    Fall     Stated Complaint: fall    HPI    Patient here with complaint of head injury.  Injury occurred when he slipped from chair and hit hit head .  erasmo neck pain, no numbness, tingling or weakness.   no vomiting.  no amnesia.  no abnormal behavior.  Other injuries none.  + blood thinners.    Past Medical History:    Atherosclerosis of coronary artery    Cancer (HCC)    skin cancer    Coronary atherosclerosis    High blood pressure    High cholesterol    Hyperlipidemia    Pacemaker       Past Surgical History:   Procedure Laterality Date    Other surgical history      excision of skin cancer from the face            No family history on file.    Social History     Socioeconomic History    Marital status:    Tobacco Use    Smoking status: Former     Current packs/day: 0.00     Types: Cigarettes     Quit date: 1966     Years since quittin.0    Smokeless tobacco: Never   Vaping Use    Vaping status: Never Used   Substance and Sexual Activity    Alcohol use: Yes     Alcohol/week: 14.0 standard drinks of alcohol     Types: 14 Glasses of wine per week     Social Drivers of Health      Received from [facility]    Housing Stability       Review of Systems    Positive for stated complaint: fall  Other systems are as noted in HPI.  Constitutional and vital signs reviewed.      All other systems reviewed and negative except as noted above.    PSFH elements reviewed from today and agreed except as otherwise stated in HPI.    Physical Exam     ED Triage Vitals [25 0954]   /59   Pulse 58   Resp 16   Temp 97.6 °F (36.4 °C)   Temp src    SpO2 98 %   O2 Device None (Room air)       Current:/51   Pulse 64   Temp 97.6 °F (36.4 °C)   Resp 16   Ht 172.7 cm (5' 8\")   Wt 116.1 kg   SpO2 96%   BMI 38.92 kg/m²   PULSE OX nl  General: Well appearing in no  distress.  Head: r occipital hematoma, no other evidence of injury. no Moser sign/raccoon eyes. no laceration  Eyes: Pupils equal round and reactive to light and accommodation. EOMI.  ENT: Oropharynx clear and patent without malocclusion of the jaw or dental injury.   Neck: neg NEXXUS   Back: non tender  Resp: no chest tenderness, no resp distres,   CV:  regular rate  Neuro/, A and O x 3.  Normal sensation to light touch in all extremities.  Cranial Nerves: Cranial nerves 2-12 intact  Cerebellar: Normal gait. Normal as tested  Extremities: Normal ROM without apparent trauma. Nontender to palpation.  Strength 5/5 in all extremities.  Reflexes 2+ in all extremitites.   Skin:  no laceration  Psych: Mood and affect normal          ED Course   Labs Reviewed - No data to display  I reviewed CT and noted no intracranial bleeding    MDM     Medical Decision Making  Amount and/or Complexity of Data Reviewed  Independent Historian: caregiver  Radiology: ordered and independent interpretation performed. Decision-making details documented in ED Course.  Discussion of management or test interpretation with external provider(s): Tylenol for pain    Risk  OTC drugs.          Disposition and Plan     Clinical Impression:  1. Contusion of head, unspecified part of head, initial encounter        Disposition:  Discharge    Follow-up:  Josr Tobias MD  [address] [facility]  [address]  [facility] [address] [phone]    Follow up        Medications Prescribed:  Current Discharge Medication List